# Patient Record
Sex: FEMALE | Race: OTHER
[De-identification: names, ages, dates, MRNs, and addresses within clinical notes are randomized per-mention and may not be internally consistent; named-entity substitution may affect disease eponyms.]

---

## 2021-04-12 ENCOUNTER — HOSPITAL ENCOUNTER (EMERGENCY)
Dept: HOSPITAL 54 - ER | Age: 36
LOS: 1 days | Discharge: HOME | End: 2021-04-13
Payer: MEDICAID

## 2021-04-12 VITALS — WEIGHT: 135 LBS | BODY MASS INDEX: 21.69 KG/M2 | HEIGHT: 66 IN

## 2021-04-12 DIAGNOSIS — G40.909: Primary | ICD-10-CM

## 2021-04-12 PROCEDURE — 80048 BASIC METABOLIC PNL TOTAL CA: CPT

## 2021-04-12 PROCEDURE — 80076 HEPATIC FUNCTION PANEL: CPT

## 2021-04-12 PROCEDURE — 85025 COMPLETE CBC W/AUTO DIFF WBC: CPT

## 2021-04-12 PROCEDURE — 99283 EMERGENCY DEPT VISIT LOW MDM: CPT

## 2021-04-12 PROCEDURE — 85007 BL SMEAR W/DIFF WBC COUNT: CPT

## 2021-04-12 PROCEDURE — G0480 DRUG TEST DEF 1-7 CLASSES: HCPCS

## 2021-04-12 PROCEDURE — 80320 DRUG SCREEN QUANTALCOHOLS: CPT

## 2021-04-12 PROCEDURE — 36415 COLL VENOUS BLD VENIPUNCTURE: CPT

## 2021-04-12 NOTE — NUR
PT EMILIE FROM STREET S/P SEIZURE. PT STATES SHE HAS HX SEIZURE, UNCOMPLIANT 
WITH MEDICATION. NO TRAUMA NOTED ON ARRIVAL. PT AAOX4. RESPIRATIONS EVEN AND 
UNLABORED. VITAL SIGNS STABLE. NO ACUTE DISTRESS NOTED AT THIS TIME. SEIZURE 
PRECAUTIONS INITIATED. PLACED ON MONITOR, WILL CONTINUE TO MONITOR

## 2021-04-13 ENCOUNTER — HOSPITAL ENCOUNTER (EMERGENCY)
Dept: HOSPITAL 54 - ER | Age: 36
Discharge: HOME | End: 2021-04-13
Payer: MEDICAID

## 2021-04-13 VITALS — SYSTOLIC BLOOD PRESSURE: 124 MMHG | DIASTOLIC BLOOD PRESSURE: 86 MMHG

## 2021-04-13 VITALS — HEIGHT: 66 IN | WEIGHT: 135 LBS | BODY MASS INDEX: 21.69 KG/M2

## 2021-04-13 VITALS — DIASTOLIC BLOOD PRESSURE: 91 MMHG | SYSTOLIC BLOOD PRESSURE: 134 MMHG

## 2021-04-13 DIAGNOSIS — R53.1: Primary | ICD-10-CM

## 2021-04-13 DIAGNOSIS — G40.909: ICD-10-CM

## 2021-04-13 DIAGNOSIS — Z59.0: ICD-10-CM

## 2021-04-13 LAB
ALBUMIN SERPL BCP-MCNC: 3.4 G/DL (ref 3.4–5)
ALP SERPL-CCNC: 111 U/L (ref 46–116)
ALT SERPL W P-5'-P-CCNC: 22 U/L (ref 12–78)
AST SERPL W P-5'-P-CCNC: 35 U/L (ref 15–37)
BASOPHILS # BLD AUTO: 0 /CMM (ref 0–0.2)
BASOPHILS NFR BLD AUTO: 0.5 % (ref 0–2)
BILIRUB DIRECT SERPL-MCNC: 0 MG/DL (ref 0–0.2)
BILIRUB SERPL-MCNC: 0.1 MG/DL (ref 0.2–1)
BUN SERPL-MCNC: 9 MG/DL (ref 7–18)
CALCIUM SERPL-MCNC: 9 MG/DL (ref 8.5–10.1)
CHLORIDE SERPL-SCNC: 105 MMOL/L (ref 98–107)
CO2 SERPL-SCNC: 26 MMOL/L (ref 21–32)
CREAT SERPL-MCNC: 0.7 MG/DL (ref 0.6–1.3)
EOSINOPHIL NFR BLD AUTO: 0.6 % (ref 0–6)
EOSINOPHIL NFR BLD MANUAL: 2 % (ref 0–4)
ETHANOL SERPL-MCNC: < 3 MG/DL (ref 0–0)
GLUCOSE SERPL-MCNC: 90 MG/DL (ref 74–106)
HCT VFR BLD AUTO: 33 % (ref 33–45)
HGB BLD-MCNC: 9.9 G/DL (ref 11.5–14.8)
LYMPHOCYTES NFR BLD AUTO: 1 /CMM (ref 0.8–4.8)
LYMPHOCYTES NFR BLD AUTO: 12.2 % (ref 20–44)
LYMPHOCYTES NFR BLD MANUAL: 11 % (ref 16–48)
MCHC RBC AUTO-ENTMCNC: 30 G/DL (ref 31–36)
MCV RBC AUTO: 66 FL (ref 82–100)
MONOCYTES NFR BLD AUTO: 0.5 /CMM (ref 0.1–1.3)
MONOCYTES NFR BLD AUTO: 6.3 % (ref 2–12)
MONOCYTES NFR BLD MANUAL: 7 % (ref 0–11)
NEUTROPHILS # BLD AUTO: 6.9 /CMM (ref 1.8–8.9)
NEUTROPHILS NFR BLD AUTO: 80.4 % (ref 43–81)
NEUTS SEG NFR BLD MANUAL: 80 % (ref 42–76)
PLATELET # BLD AUTO: 227 /CMM (ref 150–450)
POTASSIUM SERPL-SCNC: 3.9 MMOL/L (ref 3.5–5.1)
PROT SERPL-MCNC: 7.3 G/DL (ref 6.4–8.2)
RBC # BLD AUTO: 5.02 MIL/UL (ref 4–5.2)
SODIUM SERPL-SCNC: 140 MMOL/L (ref 136–145)
WBC NRBC COR # BLD AUTO: 8.6 K/UL (ref 4.3–11)

## 2021-04-13 SDOH — ECONOMIC STABILITY - HOUSING INSECURITY: HOMELESSNESS: Z59.0

## 2021-04-13 NOTE — NUR
PATIENT SEEN AND EVALUATED BY  KNEDRICK, REFUSED TO SIGN 
HOMELESS WAIVER. RESOURCES PROVIDED. Patient given written and verbal discharge 
instructions. Patient verbalizes understanding of instructions. Patient is 
ambulatory with steady gait. Refuses offer of shelter placement. Patient given 
list of available shelters in surrounding area.

## 2021-04-13 NOTE — NUR
Patient given written and verbal discharge instructions. Patient verbalizes 
understanding of instructions. Patient is ambulatory with steady gait. Refuses 
offer of shelter placement. Patient given list of available shelters in 
surrounding area.IV removed. Catheter intact and site benign. Pressure and 4x4 
applied to site. No bleeding noted.Pt ambulatory with a steady gait

## 2021-04-13 NOTE — NUR
SS Consult for homelessness:

The pt. is 35 year old Black female. SW met with pt. eugenio in ED. Pt. is sleeping and 
rousable to verbal touch. The pt. is alert & oriented. Pt. requested a tent. SW informed pt. 
that we do not have tents to give her. SW also offered pt. shelter placement and pt. 
refused. Pt. stated "no thank you, I'll just find my own way then". SW attempted to complete 
biospychosocial assessment. Pt. wanted to leave and refused to continue with interview. Pt. 
refused to sign homeless waiver. SW provided pt. with the following homeless resources and 
pt. refused to accept them:





Substance Abuse resources provided included: [facility] Substance Abuse 
Self-Helpline ([facility]) [phone]) 582-1329; [facility] [address] [facility]. [place] [address] 
[phone]; [facility] [address] [facility] [address] [phone]; 
[facility] [address] [facility] [address] [phone]; [facility] [address] [facility] [address] [phone];  [facility] [address] [facility] [address] [phone]; [facility] [address] [facility] [id number] [phone]; [facility] Substance Abuse Helpline([facility] [phone]; [facility]  
[phone]; [facility] [phone] [facility]  [phone] [place]; [facility]  (576) 434-2581 [place]; [facility] 
[phone] [address]; [facility]  [phone] [phone] [place]; [facility] [phone] 988-2703 [place]; [facility] [phone] [facility]. 
[phone] [place]; Alcoholics Anonymous (893) 976-8614-SFV; [facility] ([phone] [phone]; Marijuana Anonymous (628) 675-3454-SFV; Narcotics Anonymous www.na.org; 

Year-round shelters: [facility] [address] [place] [address] [phone]; 
[facility] [place] [address] [place] [address]; [facility] [address] [place] [address] [phone]

Winter Shelters: 

[facility]

Provider: [facility] [place]

Address: [address] [place], [address]

# of Beds: 47

Phone Number: [phone]

Population Served: [facility] 6 | [facility]

 

[place]

Provider: [facility]

Address: [address], [address] 

# of Beds: 66

Phone Number: [phone]

Population Served: [facility]

 

[place]

Provider: [facility]

Address: [address] [facility], [address]

# of Beds: 56

Phone Number: [phone]

Population Served: [facility]

 

[place] 

Provider: /[facility]

Address: [address] [place] [address], [address]

# of Beds: 49

Phone Number: [phone]

Population Served: [facility] 8 | [facility]

Provider: [facility]

Address: [address] [facility], [address]

# of Beds: 37

Phone Number: [phone]

Population Served: [facility]



Hygiene: [facility]: [address] [phone]; [facility] 
[address] [facility] [phone]; [facility] [address] [phone].

Food Resources: [place] [facility] at [facility]- [address] 
[place]; [facility] ([facility]) [address]; [facility] [address] [facility]; [facility] 
[address]. 

Mental Health resources provided: [facility] [address] [place] [address] [phone]; [facility] [employer] [address] [facility] [address], 
[place] [address] [phone]; [facility] 
[address] [place] [address] [phone]; [facility] [address] 
[place], [place] [id number] [phone]

Healthcare Clinics: [facility] [address] [facility], [address] [place]. 
[place] [phone]; [facility] [address] [facility] [address] 
[place] [address]; [facility] [address] [facility] [place] [address] [phone]



Counseling--Outpatient

[facility]

[address] [facility], [address] [place]

[place] [address] [phone]

(Specializes in in-depth psychotherapy for emotional distress: anxiety, depression, 
interpersonal conflicts, life transitions, childhood abuse)



[facility]

[address] [place] [address] [phone]

(Assist with solving problem marital difficulties, separation & divorce, aging parents, 
death & grief, chronic & terminal illness)



[facility]

[address] [place] [address] [phone]

(Deal with loss & grief, anxiety, marital difficulties) 



[facility]/Mental Health Services

[address] [facility], [address]

[place], [place] [address] [phone]

(Provide in-home mental services to people who are incapable of leaving their homes)



[facility] Elderly

Senior Service/Resource Center

[address] [place]

[place] [address] [phone]



[facility] 

[address] [facility]

[place] [address]

[phone]

PSYCHIATRIC OUTPATIENT SERVICES



[facility] Partial Hospitalization and Intensive Outpatient Program (Managed Care 
and [place] Only)[address].

[facility] [phone]

[facility]

Partial Hospitalization and Outpatient [id number]  [address]

[place], [place] [phone]

[facility] Partial Hospitalization and Outpatient [id number]

[place], [place] [phone]

[facility] Mental Health [facility] [id number] [facility]. [address]

[place], [place] [phone]

[facility] Partial Hospitalization and Outpatient 
[id number] [place], [phone]

[phone]

## 2021-04-13 NOTE — NUR
PT UNABLE TO PROVIDE URINE SAMPLE AT THIS TIME. PT DENIES POSSIBLE CHANCE OF 
PREGNANCY, REFUSING TO SIGN WAIVER. PT ALSO REFUSING HEAD CT. RISKS AND 
BENEFITS EXPLAINED TO PATIENT, PT VERBALIZED UNDERSTANDING. MD AWARE

## 2021-04-13 NOTE — NUR
IV INITIATED R WRIST 20G. LABS DRAWN FROM SITE. PHLEBOTOMIST AT BEDSIDE FOR 
COLLECTION. IV INTACT AND PATENT, PLACED ON SALINE LOCK

## 2021-04-13 NOTE — NUR
[id number] FROM STREET PER RA, PT C/O GENERALIZED WEAKNESS. PT RECENTLY SEEN AND 
DISCHARGED FROM [facility] ER X2HR AGO. PER RA, PT REFUSED EKG EN ROUTE. PT DENIES 
SOB, CP, PAIN ON ARRIVAL. REQUESTING . VITAL SIGNS STABLE. PT 
PLACED ON MONITOR, WILL CONTINUE TO MONITOR.

## 2021-04-13 NOTE — NUR
-------------------------------------------------------------------------------

          *** Note singh in EDM - 04/13/21 at 0034 by ALVA ***           

-------------------------------------------------------------------------------

PT UNABLE TO PROVIDE URINE SAMPLE AT THIS TIME. PT DENIES POSSIBLE CHANCE OF 
PREGNANCY, REFUSING TO SIGN WAIVER. PT ALSO REFUSING HEAD CT, MD AWARE.